# Patient Record
Sex: MALE | Race: BLACK OR AFRICAN AMERICAN | Employment: OTHER | URBAN - METROPOLITAN AREA
[De-identification: names, ages, dates, MRNs, and addresses within clinical notes are randomized per-mention and may not be internally consistent; named-entity substitution may affect disease eponyms.]

---

## 2020-09-10 ENCOUNTER — HOSPITAL ENCOUNTER (OUTPATIENT)
Age: 60
Discharge: HOME | End: 2020-08-31
Attending: PHYSICIAN ASSISTANT
Payer: OTHER GOVERNMENT

## 2020-09-10 DIAGNOSIS — M47.812 CERVICAL SPONDYLOSIS WITHOUT MYELOPATHY: ICD-10-CM

## 2020-09-10 DIAGNOSIS — M54.6 PAIN IN THORACIC SPINE: ICD-10-CM

## 2020-09-10 PROCEDURE — 72141 MRI NECK SPINE W/O DYE: CPT

## 2020-09-10 PROCEDURE — 72146 MRI CHEST SPINE W/O DYE: CPT

## 2024-02-12 ENCOUNTER — OFFICE VISIT (OUTPATIENT)
Age: 64
End: 2024-02-12
Payer: MEDICARE

## 2024-02-12 VITALS
RESPIRATION RATE: 16 BRPM | WEIGHT: 269 LBS | DIASTOLIC BLOOD PRESSURE: 79 MMHG | SYSTOLIC BLOOD PRESSURE: 125 MMHG | OXYGEN SATURATION: 95 % | BODY MASS INDEX: 40.77 KG/M2 | HEIGHT: 68 IN | TEMPERATURE: 97.3 F | HEART RATE: 92 BPM

## 2024-02-12 DIAGNOSIS — R94.31 ABNORMAL ECG: ICD-10-CM

## 2024-02-12 DIAGNOSIS — E78.00 HYPERCHOLESTEREMIA: ICD-10-CM

## 2024-02-12 DIAGNOSIS — G47.33 OBSTRUCTIVE SLEEP APNEA: ICD-10-CM

## 2024-02-12 DIAGNOSIS — I42.0 CONGESTIVE CARDIOMYOPATHY (HCC): ICD-10-CM

## 2024-02-12 DIAGNOSIS — E11.9 TYPE 2 DIABETES MELLITUS WITHOUT COMPLICATION, WITHOUT LONG-TERM CURRENT USE OF INSULIN (HCC): ICD-10-CM

## 2024-02-12 DIAGNOSIS — I10 PRIMARY HYPERTENSION: ICD-10-CM

## 2024-02-12 DIAGNOSIS — R55 SYNCOPE AND COLLAPSE: ICD-10-CM

## 2024-02-12 DIAGNOSIS — I51.89 DIASTOLIC DYSFUNCTION: ICD-10-CM

## 2024-02-12 DIAGNOSIS — R01.1 SYSTOLIC MURMUR: ICD-10-CM

## 2024-02-12 DIAGNOSIS — I25.10 CORONARY ARTERY DISEASE INVOLVING NATIVE CORONARY ARTERY OF NATIVE HEART WITHOUT ANGINA PECTORIS: Primary | ICD-10-CM

## 2024-02-12 PROBLEM — E11.69 DYSLIPIDEMIA ASSOCIATED WITH TYPE 2 DIABETES MELLITUS (HCC): Chronic | Status: ACTIVE | Noted: 2023-04-08

## 2024-02-12 PROBLEM — Z02.9 ENCOUNTERS FOR ADMINISTRATIVE PURPOSES: Status: ACTIVE | Noted: 2023-03-06

## 2024-02-12 PROBLEM — K76.0 FATTY LIVER: Status: ACTIVE | Noted: 2022-05-09

## 2024-02-12 PROBLEM — E11.59 HYPERTENSION ASSOCIATED WITH DIABETES (HCC): Chronic | Status: ACTIVE | Noted: 2023-04-08

## 2024-02-12 PROBLEM — M17.9 OSTEOARTHRITIS OF KNEE: Status: ACTIVE | Noted: 2024-02-12

## 2024-02-12 PROBLEM — E55.9 VITAMIN D DEFICIENCY: Chronic | Status: ACTIVE | Noted: 2023-04-10

## 2024-02-12 PROBLEM — R82.81 PYURIA: Status: ACTIVE | Noted: 2023-03-20

## 2024-02-12 PROBLEM — B35.3 TINEA PEDIS: Status: ACTIVE | Noted: 2018-05-04

## 2024-02-12 PROBLEM — I15.2 HYPERTENSION ASSOCIATED WITH DIABETES (HCC): Chronic | Status: ACTIVE | Noted: 2023-04-08

## 2024-02-12 PROBLEM — M72.2 PLANTAR FASCIITIS OF RIGHT FOOT: Status: ACTIVE | Noted: 2024-02-12

## 2024-02-12 PROBLEM — E11.01 HHNC (HYPERGLYCEMIC HYPEROSMOLAR NONKETOTIC COMA) (HCC): Status: ACTIVE | Noted: 2023-04-08

## 2024-02-12 PROBLEM — M16.9 OSTEOARTHRITIS OF HIP: Status: ACTIVE | Noted: 2024-02-12

## 2024-02-12 PROBLEM — H93.13 TINNITUS, BILATERAL: Status: ACTIVE | Noted: 2017-07-22

## 2024-02-12 PROBLEM — J31.0 RHINITIS: Status: ACTIVE | Noted: 2024-02-12

## 2024-02-12 PROBLEM — M70.71 BURSITIS OF RIGHT HIP: Status: ACTIVE | Noted: 2024-02-12

## 2024-02-12 PROBLEM — Z86.16 HISTORY OF SEVERE ACUTE RESPIRATORY SYNDROME CORONAVIRUS 2 (SARS-COV-2) DISEASE: Status: ACTIVE | Noted: 2024-02-12

## 2024-02-12 PROBLEM — E78.5 DYSLIPIDEMIA ASSOCIATED WITH TYPE 2 DIABETES MELLITUS (HCC): Chronic | Status: ACTIVE | Noted: 2023-04-08

## 2024-02-12 PROBLEM — E11.65 TYPE 2 DIABETES MELLITUS WITH HYPERGLYCEMIA, WITHOUT LONG-TERM CURRENT USE OF INSULIN (HCC): Chronic | Status: ACTIVE | Noted: 2023-04-08

## 2024-02-12 PROBLEM — M72.2 PLANTAR FASCIAL FIBROMATOSIS: Chronic | Status: ACTIVE | Noted: 2023-04-10

## 2024-02-12 PROBLEM — M25.569 ARTHRALGIA OF KNEE: Status: ACTIVE | Noted: 2024-02-12

## 2024-02-12 PROBLEM — M54.30 SCIATICA: Status: ACTIVE | Noted: 2024-02-12

## 2024-02-12 PROBLEM — M17.10 PRIMARY LOCALIZED OSTEOARTHROSIS, LOWER LEG: Status: ACTIVE | Noted: 2024-02-12

## 2024-02-12 PROBLEM — Z76.0 ENCOUNTER FOR ISSUE OF REPEAT PRESCRIPTION: Status: ACTIVE | Noted: 2024-02-12

## 2024-02-12 PROBLEM — H90.5 UNSPECIFIED SENSORINEURAL HEARING LOSS: Status: ACTIVE | Noted: 2024-02-12

## 2024-02-12 PROBLEM — E66.01 MORBID OBESITY (HCC): Status: ACTIVE | Noted: 2024-02-12

## 2024-02-12 PROBLEM — H81.13 BENIGN PAROXYSMAL VERTIGO, BILATERAL: Chronic | Status: ACTIVE | Noted: 2023-04-10

## 2024-02-12 PROBLEM — I11.9 HYPERTENSIVE HEART DISEASE: Chronic | Status: ACTIVE | Noted: 2023-04-10

## 2024-02-12 PROBLEM — R26.2 DISABILITY OF WALKING: Status: ACTIVE | Noted: 2024-02-12

## 2024-02-12 PROBLEM — N17.9 AKI (ACUTE KIDNEY INJURY) (HCC): Status: ACTIVE | Noted: 2023-04-07

## 2024-02-12 PROBLEM — R51.9 HEADACHE, UNSPECIFIED: Status: ACTIVE | Noted: 2024-02-12

## 2024-02-12 PROBLEM — M72.2 PLANTAR FASCIITIS OF LEFT FOOT: Status: ACTIVE | Noted: 2024-02-12

## 2024-02-12 PROBLEM — G47.00 INSOMNIA: Chronic | Status: ACTIVE | Noted: 2023-03-20

## 2024-02-12 PROBLEM — J30.9 ALLERGIC RHINITIS, UNSPECIFIED: Status: ACTIVE | Noted: 2024-02-12

## 2024-02-12 PROCEDURE — 99214 OFFICE O/P EST MOD 30 MIN: CPT | Performed by: INTERNAL MEDICINE

## 2024-02-12 PROCEDURE — 3074F SYST BP LT 130 MM HG: CPT | Performed by: INTERNAL MEDICINE

## 2024-02-12 PROCEDURE — 3078F DIAST BP <80 MM HG: CPT | Performed by: INTERNAL MEDICINE

## 2024-02-12 PROCEDURE — 1123F ACP DISCUSS/DSCN MKR DOCD: CPT | Performed by: INTERNAL MEDICINE

## 2024-02-12 RX ORDER — AMLODIPINE BESYLATE 10 MG/1
10 TABLET ORAL DAILY
COMMUNITY
Start: 2023-03-20 | End: 2024-03-19

## 2024-02-12 RX ORDER — PEN NEEDLE, DIABETIC 32GX 5/32"
1 NEEDLE, DISPOSABLE MISCELLANEOUS DAILY
COMMUNITY
Start: 2024-01-30

## 2024-02-12 RX ORDER — AMITRIPTYLINE HYDROCHLORIDE 25 MG/1
25 TABLET, FILM COATED ORAL NIGHTLY
COMMUNITY
Start: 2022-03-07 | End: 2024-03-19

## 2024-02-12 RX ORDER — LISINOPRIL 40 MG/1
40 TABLET ORAL DAILY
COMMUNITY
Start: 2023-03-20 | End: 2024-03-19

## 2024-02-12 RX ORDER — DULAGLUTIDE 3 MG/.5ML
3 INJECTION, SOLUTION SUBCUTANEOUS
COMMUNITY
Start: 2023-08-10 | End: 2024-08-06

## 2024-02-12 RX ORDER — INSULIN GLARGINE 100 [IU]/ML
INJECTION, SOLUTION SUBCUTANEOUS NIGHTLY
COMMUNITY
Start: 2023-04-10 | End: 2024-04-17

## 2024-02-12 RX ORDER — ATORVASTATIN CALCIUM 20 MG/1
20 TABLET, FILM COATED ORAL DAILY
COMMUNITY

## 2024-02-12 NOTE — PROGRESS NOTES
Rajendra Castanon (:  1960) is a 63 y.o. male,Established patient, here for evaluation of the following chief complaint(s):  Follow-up      Subjective   SUBJECTIVE/OBJECTIVE:  HPI  Patient presents today for follow-up.  He has a history of coronary artery disease, hypertension, and hypercholesterolemia.           Today, he is doing relatively well. Patient was lost to follow-up after his last visit with me a year ago. He had a CAT scan performed after having COVID in 2022 and was found to have coronary atherosclerosis noted on that scan. He was sent here for further evaluation, but no symptoms of chest discomfort were noted. He was scheduled for a stress test, but did not complete it due to some insurance issues and was lost to follow-up until now. Patient did have a cardiac CTA performed which showed a 70% occlusion in the circumflex vessel and a mid 50% LAD occlusion. I was asked to re-evaluate his cardiac status and make recommendations. Patient has had no symptoms of chest discomfort or shortness of breath. His diabetes was poorly controlled with an A1c of 10 but it is now better. He is seeing an endocrinologist. Blood sugars were as high as 700 earlier this last year. No palpitations. No orthopnea. He is able to ambulate without restriction. No history of known heart failure.  His stress test was negative for active ischemia but it did demonstrate a potential reduced ejection fraction of 20% but this could be from left ventricular hypertrophy and a false low EF reading         I have carefully reviewed all available medical records, previous office notes, lab, x-ray, and procedure reports.    Past Medical History:   Diagnosis Date    Diabetes mellitus (HCC)     Hyperlipidemia     Hypertension         History reviewed. No pertinent surgical history.     No Known Allergies     Current Outpatient Medications   Medication Sig Dispense Refill    amitriptyline (ELAVIL) 25 MG tablet Take 1 tablet by

## 2024-02-23 ENCOUNTER — HOSPITAL ENCOUNTER (OUTPATIENT)
Facility: HOSPITAL | Age: 64
End: 2024-02-23
Payer: MEDICARE

## 2024-02-23 DIAGNOSIS — R91.1 LUNG NODULE: ICD-10-CM

## 2024-02-23 PROCEDURE — 71250 CT THORAX DX C-: CPT

## 2025-01-29 ENCOUNTER — OFFICE VISIT (OUTPATIENT)
Age: 65
End: 2025-01-29
Payer: MEDICARE

## 2025-01-29 VITALS
HEIGHT: 68 IN | OXYGEN SATURATION: 97 % | SYSTOLIC BLOOD PRESSURE: 136 MMHG | WEIGHT: 269 LBS | DIASTOLIC BLOOD PRESSURE: 75 MMHG | TEMPERATURE: 97.6 F | BODY MASS INDEX: 40.77 KG/M2 | HEART RATE: 87 BPM

## 2025-01-29 DIAGNOSIS — I25.10 CORONARY ARTERY DISEASE INVOLVING NATIVE CORONARY ARTERY OF NATIVE HEART WITHOUT ANGINA PECTORIS: Primary | ICD-10-CM

## 2025-01-29 DIAGNOSIS — I51.89 DIASTOLIC DYSFUNCTION: ICD-10-CM

## 2025-01-29 DIAGNOSIS — R55 SYNCOPE AND COLLAPSE: ICD-10-CM

## 2025-01-29 DIAGNOSIS — E78.00 HYPERCHOLESTEREMIA: ICD-10-CM

## 2025-01-29 DIAGNOSIS — R01.1 SYSTOLIC MURMUR: ICD-10-CM

## 2025-01-29 DIAGNOSIS — I10 PRIMARY HYPERTENSION: ICD-10-CM

## 2025-01-29 DIAGNOSIS — E66.01 CLASS 3 SEVERE OBESITY DUE TO EXCESS CALORIES WITHOUT SERIOUS COMORBIDITY WITH BODY MASS INDEX (BMI) OF 40.0 TO 44.9 IN ADULT: ICD-10-CM

## 2025-01-29 DIAGNOSIS — G47.33 OBSTRUCTIVE SLEEP APNEA: ICD-10-CM

## 2025-01-29 DIAGNOSIS — R94.31 ABNORMAL ECG: ICD-10-CM

## 2025-01-29 DIAGNOSIS — E66.813 CLASS 3 SEVERE OBESITY DUE TO EXCESS CALORIES WITHOUT SERIOUS COMORBIDITY WITH BODY MASS INDEX (BMI) OF 40.0 TO 44.9 IN ADULT: ICD-10-CM

## 2025-01-29 DIAGNOSIS — E11.9 TYPE 2 DIABETES MELLITUS WITHOUT COMPLICATION, WITHOUT LONG-TERM CURRENT USE OF INSULIN (HCC): ICD-10-CM

## 2025-01-29 PROCEDURE — 3075F SYST BP GE 130 - 139MM HG: CPT | Performed by: INTERNAL MEDICINE

## 2025-01-29 PROCEDURE — 1126F AMNT PAIN NOTED NONE PRSNT: CPT | Performed by: INTERNAL MEDICINE

## 2025-01-29 PROCEDURE — 99214 OFFICE O/P EST MOD 30 MIN: CPT | Performed by: INTERNAL MEDICINE

## 2025-01-29 PROCEDURE — 3078F DIAST BP <80 MM HG: CPT | Performed by: INTERNAL MEDICINE

## 2025-01-29 PROCEDURE — 1159F MED LIST DOCD IN RCRD: CPT | Performed by: INTERNAL MEDICINE

## 2025-01-29 PROCEDURE — 1123F ACP DISCUSS/DSCN MKR DOCD: CPT | Performed by: INTERNAL MEDICINE

## 2025-01-29 RX ORDER — GABAPENTIN 300 MG/1
300 CAPSULE ORAL 3 TIMES DAILY
COMMUNITY
Start: 2024-12-03

## 2025-01-29 RX ORDER — NAPROXEN 500 MG/1
500 TABLET ORAL 2 TIMES DAILY WITH MEALS
COMMUNITY

## 2025-01-29 ASSESSMENT — PATIENT HEALTH QUESTIONNAIRE - PHQ9
2. FEELING DOWN, DEPRESSED OR HOPELESS: NOT AT ALL
SUM OF ALL RESPONSES TO PHQ QUESTIONS 1-9: 0
SUM OF ALL RESPONSES TO PHQ9 QUESTIONS 1 & 2: 0
SUM OF ALL RESPONSES TO PHQ QUESTIONS 1-9: 0
1. LITTLE INTEREST OR PLEASURE IN DOING THINGS: NOT AT ALL

## 2025-01-29 ASSESSMENT — ENCOUNTER SYMPTOMS
ALLERGIC/IMMUNOLOGIC NEGATIVE: 1
GASTROINTESTINAL NEGATIVE: 1
SHORTNESS OF BREATH: 0
EYES NEGATIVE: 1

## 2025-01-29 NOTE — PROGRESS NOTES
Rajendra Castanon (:  1960) is a 64 y.o. male,Established patient, here for evaluation of the following chief complaint(s):  Follow-up    Subjective   SUBJECTIVE/OBJECTIVE:  History of Present Illness  The patient presents for evaluation of coronary artery disease, syncope, hypertension, diastolic dysfunction, obstructive sleep apnea, hypercholesterolemia, and diabetes mellitus. He had a CAT scan performed after having COVID in 2022 and was found to have coronary atherosclerosis noted on that scan. Patient did have a cardiac CTA performed which showed a 70% occlusion in the circumflex vessel and a mid 50% LAD occlusion. Patient has had no symptoms of chest discomfort or shortness of breath. His diabetes was poorly controlled with an A1c of 10 but it is now better. His stress test was negative for active ischemia but it did demonstrate a potential reduced ejection fraction of 20%.  Subsequent echo showed an EF of 50 to 55%    He reports a stable condition with no episodes of lightheadedness, dizziness, or syncope. He does not experience any chest discomfort, tightness, or squeezing sensations during physical activity. He also does not experience claudication in his calves during ambulation. He acknowledges the need for increased physical activity, currently engaging in yard work and owning a treadmill, which he admits to not using regularly. An EKG performed at [facility] in 2024 yielded normal results.    He has been managing his diabetes with Trulicity and Jardiance, but has experienced constipation due to dosage adjustments. He has been managing his diabetes with Trulicity and Jardiance. To alleviate this, he has been using milk of magnesia and prune juice, which have helped regulate his bowel movements within a day or two. Despite these challenges, he plans to continue with Jardiance and has a follow-up appointment scheduled for next month.    He is compliant with

## 2025-08-13 ENCOUNTER — OFFICE VISIT (OUTPATIENT)
Age: 65
End: 2025-08-13

## 2025-08-13 VITALS — RESPIRATION RATE: 14 BRPM | HEIGHT: 68 IN | BODY MASS INDEX: 40.77 KG/M2 | WEIGHT: 269 LBS

## 2025-08-13 DIAGNOSIS — M16.12 PRIMARY OSTEOARTHRITIS OF LEFT HIP: ICD-10-CM

## 2025-08-13 DIAGNOSIS — Z96.641 HISTORY OF TOTAL HIP REPLACEMENT, RIGHT: ICD-10-CM

## 2025-08-13 DIAGNOSIS — M25.552 LEFT HIP PAIN: Primary | ICD-10-CM

## 2025-08-13 RX ORDER — MELOXICAM 15 MG/1
TABLET ORAL
Qty: 30 TABLET | Refills: 1 | Status: SHIPPED | OUTPATIENT
Start: 2025-08-13

## 2025-08-21 DIAGNOSIS — M16.12 PRIMARY OSTEOARTHRITIS OF LEFT HIP: ICD-10-CM

## 2025-08-21 DIAGNOSIS — Z01.818 PRE-OP TESTING: Primary | ICD-10-CM
